# Patient Record
Sex: FEMALE | Race: WHITE
[De-identification: names, ages, dates, MRNs, and addresses within clinical notes are randomized per-mention and may not be internally consistent; named-entity substitution may affect disease eponyms.]

---

## 2021-08-11 ENCOUNTER — RX ONLY (OUTPATIENT)
Age: 61
Setting detail: RX ONLY
End: 2021-08-11

## 2021-08-11 ENCOUNTER — APPOINTMENT (RX ONLY)
Dept: URBAN - METROPOLITAN AREA CLINIC 316 | Facility: CLINIC | Age: 61
Setting detail: DERMATOLOGY
End: 2021-08-11

## 2021-08-11 DIAGNOSIS — L98.8 OTHER SPECIFIED DISORDERS OF THE SKIN AND SUBCUTANEOUS TISSUE: ICD-10-CM

## 2021-08-11 PROCEDURE — ? ELLMAN CO2

## 2021-08-11 RX ORDER — VALACYCLOVIR 1 G/1
1 TABLET, FILM COATED ORAL BID
Qty: 30 | Refills: 0 | Status: ERX | COMMUNITY
Start: 2021-08-11

## 2021-08-11 ASSESSMENT — LOCATION ZONE DERM: LOCATION ZONE: FACE

## 2021-08-11 ASSESSMENT — LOCATION DETAILED DESCRIPTION DERM: LOCATION DETAILED: LEFT INFERIOR CENTRAL MALAR CHEEK

## 2021-08-11 ASSESSMENT — LOCATION SIMPLE DESCRIPTION DERM: LOCATION SIMPLE: LEFT CHEEK

## 2021-08-11 NOTE — HPI: COSMETIC CONSULTATION
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 She requests that the consult and proceedure be done today, since she has traveled so far to come to the office.  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 \\n\\nShe has done micro needling to her face, and TCA peels.   She does not get sun exposure and DOES use daily sunblock.

## 2021-08-11 NOTE — PROCEDURE: ELLMAN CO2
Topical Anesthesia?: BLT cream (benzocaine 20%, lidocaine 6%, tetracaine 6%)
Detail Level: Zone
Indication: Rytides & Dischromia
Beam Overlap/Density (Include Units): 35%
Wavelength (Include Units): 10,600
Pulse Duration (Include Units): 3ms. (2ms for feathering of edges)
Anesthesia Volume In Cc: 0
Consent: .\\n\\nPrior to the procedure consent obtained, risks reviewed including but not limited to crusting, scabbing, blistering, scarring, darker or lighter pigmentary change, incidental hair removal, bruising, and/or incomplete removal.  We again discussed the risks, and all questions were answered by myself.  I stressed several times to the patient, that this proceedure can cause prolonged redness.
Eye Protection: opaque eyeshield
Pre-Procedure Care: .\\n\\nThe patients face was cleansed thoroughly with cetaphil foaming cleanser, then wiped with an alcohol pad prior to application of BLT cream.  Prior to the procedure, the patient and all present had protective eyewear in place and a warning sign was placed on the door. Her skin was wiped clear of aneasthetic cream during proceedure with alcohol prep, followed by water, and air-dried.
Length Of Topical Anesthesia Application (Optional): 60 minutes
Spotsize (Include Units): 18
Cooling: cool compresses & ice packs
Price (Use Numbers Only, No Special Characters Or $): 1500
End-Point And Post-Procedure Care: .\\n\\nThe procedure continued until pinpoint charring & mild erythema was noted.  Immediately following the procedure, pt was brought to the sink and cool compresses with sterile towel dipped in ice-water were applied.  Aquaphore was then applied.  Post care reviewed with patient again prior to departing, with emphasis on cooling her skin on the drive home & as soon as she arrives home.
Post-Care Instructions: I reviewed with the patient in detail post-care instructions. Patient should stay away from the sun and wear sun protection until treated areas are fully healed.
Render Post-Care In The Note: Yes
Laser Type: heriberto
Treatment Number: 1
Pulse Energy (Include Units): 23W. (22W for feathering of edges)
Number Of Passes: 1   (2 passes around eyes, lips and forehead)